# Patient Record
Sex: MALE | Race: WHITE | NOT HISPANIC OR LATINO | Employment: STUDENT | URBAN - NONMETROPOLITAN AREA
[De-identification: names, ages, dates, MRNs, and addresses within clinical notes are randomized per-mention and may not be internally consistent; named-entity substitution may affect disease eponyms.]

---
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
## 2023-02-24 PROCEDURE — 93005 ELECTROCARDIOGRAM TRACING: CPT

## 2023-02-24 PROCEDURE — 99283 EMERGENCY DEPT VISIT LOW MDM: CPT

## 2023-02-25 ENCOUNTER — HOSPITAL ENCOUNTER (EMERGENCY)
Facility: CLINIC | Age: 23
Discharge: HOME | End: 2023-02-25
Attending: EMERGENCY MEDICINE | Admitting: EMERGENCY MEDICINE
Payer: COMMERCIAL

## 2023-02-25 VITALS
SYSTOLIC BLOOD PRESSURE: 122 MMHG | OXYGEN SATURATION: 95 % | DIASTOLIC BLOOD PRESSURE: 81 MMHG | TEMPERATURE: 98.1 F | RESPIRATION RATE: 18 BRPM | HEART RATE: 88 BPM | BODY MASS INDEX: 38.36 KG/M2 | WEIGHT: 315 LBS | HEIGHT: 76 IN

## 2023-02-25 DIAGNOSIS — F41.0 PANIC ATTACK: ICD-10-CM

## 2023-02-25 LAB
ATRIAL RATE - MUSE: 86 BPM
DIASTOLIC BLOOD PRESSURE - MUSE: NORMAL MMHG
INTERPRETATION ECG - MUSE: NORMAL
P AXIS - MUSE: 48 DEGREES
PR INTERVAL - MUSE: 138 MS
QRS DURATION - MUSE: 96 MS
QT - MUSE: 360 MS
QTC - MUSE: 430 MS
R AXIS - MUSE: 62 DEGREES
SYSTOLIC BLOOD PRESSURE - MUSE: NORMAL MMHG
T AXIS - MUSE: 63 DEGREES
VENTRICULAR RATE- MUSE: 86 BPM

## 2023-02-25 PROCEDURE — 250N000013 HC RX MED GY IP 250 OP 250 PS 637: Performed by: EMERGENCY MEDICINE

## 2023-02-25 RX ORDER — HYDROXYZINE HYDROCHLORIDE 25 MG/1
25 TABLET, FILM COATED ORAL ONCE
Status: COMPLETED | OUTPATIENT
Start: 2023-02-25 | End: 2023-02-25

## 2023-02-25 RX ORDER — HYDROXYZINE HYDROCHLORIDE 25 MG/1
25 TABLET, FILM COATED ORAL 3 TIMES DAILY PRN
Qty: 20 TABLET | Refills: 0 | Status: SHIPPED | OUTPATIENT
Start: 2023-02-25

## 2023-02-25 RX ADMIN — HYDROXYZINE HYDROCHLORIDE 25 MG: 25 TABLET ORAL at 02:03

## 2023-02-25 ASSESSMENT — ENCOUNTER SYMPTOMS
LIGHT-HEADEDNESS: 1
SHORTNESS OF BREATH: 1
NERVOUS/ANXIOUS: 1
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
## 2023-02-25 NOTE — DISCHARGE INSTRUCTIONS
Below are therapy resources near you:    [facility]  [address], [place] [address]   [phone]    [facility]  [address], [place] [address]   [phone]    [facility]  [address], [place] [address]   [phone]    [facility]  [address], [address], [place] [address]   [phone]    [facility]   [address], [place] [address]   [phone]    [facility]  At: [facility]  [address], [place] [address]  [phone]    [facility]  [address] , [place] [address]   [phone]    [facility] [place] (usually has soonest appointments)  [address], [place] [address]   [phone]    [facility]  At: [facility]  [address], [place] [address]   [phone]    [facility]  [address], [place] [address]   [phone]

## 2023-02-25 NOTE — ED PROVIDER NOTES
"  History     Chief Complaint:  Shortness of Breath, Syncope, and Panic Attack       HPI   Trenton Howard is a 22 year old male who presents with shortness of breath secondary to severe anxiety. Patient was alone in his bed tonight when began to feel anxious then developed shortness of breath, light headedness, and tunnel vision. Patient told his parents about his anxiety and sat in the hallway to rest. At this time he began to speak in shorter simpler sentences. EMS was called and he then got on his knees for 15 minutes before EMT arrived. EMT took EKG which was reassuring and helped the patient calm down. Patient was driven in a private vehicle to the ED and on route had another episode of severe anxiety. Here in the ED he reports that his anxiety has been improved for the last 30 minutes and is helped by focusing on his breathing. He denies chest pain, suicidal thought, homicidal thoughts, alcohol use, and use of other drugs. Patient reports that he has been stressed due to school, working 2 jobs, and the recent passing of his grandfather. His anxiety primarily occurs when he is alone. He does not have a therapist and and was last seen by a primary care provider 2 years ago. Patient has 2 mild panic attacks per week for the past few months but has never had this kind of \"10/10\" panic before.       Independent Historian:   Parent - They report that he has had small panic attacks frequently for the past few months.      ROS:  Review of Systems   Eyes: Positive for visual disturbance.   Respiratory: Positive for shortness of breath.    Cardiovascular: Negative for chest pain.   Neurological: Positive for light-headedness.   Psychiatric/Behavioral: Negative for suicidal ideas. The patient is nervous/anxious.    All other systems reviewed and are negative.    Allergies:  No known drug allergies      Medications:    The patient is not currently taking any prescribed medications.     Past Medical History:    The " "patient denies any significant past medical history.     Social History:  Patient presents with mother   PCP: No Ref-Primary, Physician     Physical Exam     Patient Vitals for the past 24 hrs:   BP Temp Temp src Pulse Resp SpO2 Height Weight   02/25/23 0200 122/81 -- -- 88 18 -- -- --   02/24/23 2351 (!) 147/74 -- -- 89 -- -- -- --   02/24/23 2350 -- 98.1  F (36.7  C) Skin 75 18 95 % 1.93 m (6' 4\") (!) 158.8 kg (350 lb)        Physical Exam  General: Alert, No distress. Nontoxic appearance  Head: No signs of trauma.   Mouth/Throat: Oropharynx moist.   Eyes: Conjunctivae are normal. Pupils are equal..   Neck: Normal range of motion.    CV: Appears well perfused.  Resp:No respiratory distress.   MSK: Normal range of motion. No obvious deformity.   Neuro: The patient is alert and interactive. MEDRANO. Speech normal. GCS 15  Skin: No lesion or sign of trauma noted.   Psych: No thoughts of self harm or harming others. Denies hallucinations. Does not appear to be responding to internal stimuli. Currently does not appear anxious.     Emergency Department Course   ECG  ECG results from 02/25/23   EKG 12-lead, tracing only     Value    Systolic Blood Pressure     Diastolic Blood Pressure     Ventricular Rate 86    Atrial Rate 86    AZ Interval 138    QRS Duration 96        QTc 430    P Axis 48    R AXIS 62    T Axis 63    Interpretation ECG      Sinus rhythm with sinus arrhythmia  Normal ECG  No previous ECGs available  Confirmed by GENERATED REPORT, COMPUTER ([id number]),  LINDEN REGAN ([id number]) on 2/25/2023 1:21:17 AM        Emergency Department Course & Assessments:  Interventions:  Medications   hydrOXYzine (ATARAX) tablet 25 mg (25 mg Oral $Given 2/25/23 0203)        Consultations/Discussion of Management or Tests:  0130 I spoke with DEC regarding patient's plan of care.       Assessments:  0113 I obtained history and examined the patient as noted above.     Disposition:  The patient was discharged to home. "     Impression & Plan    Medical Decision Making:    Trenton Howard is a 22 year old male who presents with sensation of shortness of breath and lightheadedness.  The work up in the Emergency Department is negative, monitoring and EKG have not shown any abnormal heart rhythms or concerning morphologies.  The differential diagnosis is broad and includes life threatening etiologies such as acute coronary syndrome, myocardial infarction, pulmonary embolism, electrolyte abnormalities, drug reaction, anxiety, amongst others. His symptoms have fully resolved and are consistent with anxiety.  No lab evaluation was needed based on his description of symptoms, history of similar, and risk factors.  No exertional symptoms.  No serious etiology for the palpitations were detected today during this visit.  I suspect the symptoms are secondary to panic attack.  Close follow up with primary care is indicated should the symptoms continue, as further work up may be performed; this was made clear to the patient, who understands.      Diagnosis:    ICD-10-CM    1. Panic attack  F41.0            Discharge Medications:  Discharge Medication List as of 2/25/2023  1:57 AM      START taking these medications    Details   hydrOXYzine (ATARAX) 25 MG tablet Take 1 tablet (25 mg) by mouth 3 times daily as needed for anxiety, Disp-20 tablet, R-0, E-Prescribe                Bandar Mcclure  2/25/2023   Shadia Muhammad MD Debroux, Karah M, MD  02/25/23 0624